# Patient Record
Sex: FEMALE | Race: WHITE | NOT HISPANIC OR LATINO | URBAN - METROPOLITAN AREA
[De-identification: names, ages, dates, MRNs, and addresses within clinical notes are randomized per-mention and may not be internally consistent; named-entity substitution may affect disease eponyms.]

---

## 2018-07-26 ENCOUNTER — OFFICE (OUTPATIENT)
Dept: URBAN - METROPOLITAN AREA CLINIC 14 | Facility: CLINIC | Age: 63
End: 2018-07-26

## 2018-07-26 VITALS
SYSTOLIC BLOOD PRESSURE: 126 MMHG | DIASTOLIC BLOOD PRESSURE: 87 MMHG | WEIGHT: 150 LBS | HEIGHT: 61 IN | HEART RATE: 62 BPM

## 2018-07-26 DIAGNOSIS — Z12.11 ENCOUNTER FOR SCREENING FOR MALIGNANT NEOPLASM OF COLON: ICD-10-CM

## 2018-07-26 DIAGNOSIS — Z79.01 LONG TERM (CURRENT) USE OF ANTICOAGULANTS: ICD-10-CM

## 2018-07-26 DIAGNOSIS — Z95.4 PRESENCE OF OTHER HEART-VALVE REPLACEMENT: ICD-10-CM

## 2018-07-26 DIAGNOSIS — Z86.718 PERSONAL HISTORY OF OTHER VENOUS THROMBOSIS AND EMBOLISM: ICD-10-CM

## 2018-07-26 DIAGNOSIS — Z01.818 ENCOUNTER FOR OTHER PREPROCEDURAL EXAMINATION: ICD-10-CM

## 2018-07-26 DIAGNOSIS — Q87.40 MARFAN SYNDROME, UNSPECIFIED: ICD-10-CM

## 2018-07-26 DIAGNOSIS — I26.99 OTHER PULMONARY EMBOLISM WITHOUT ACUTE COR PULMONALE: ICD-10-CM

## 2018-07-26 DIAGNOSIS — E66.3 OVERWEIGHT: ICD-10-CM

## 2018-07-26 PROCEDURE — 99203 OFFICE O/P NEW LOW 30 MIN: CPT | Performed by: INTERNAL MEDICINE

## 2018-07-26 RX ORDER — SODIUM PICOSULFATE, MAGNESIUM OXIDE, AND ANHYDROUS CITRIC ACID 10; 3.5; 12 MG/160ML; G/160ML; G/160ML
LIQUID ORAL
Qty: 1 | Refills: 0 | Status: ACTIVE
Start: 2018-07-26

## 2018-07-26 NOTE — SERVICEHPINOTES
Ms. Trimble is a 63-year-old female here for evaluation for colon cancer screening. Patient has never had a colonoscopy and she denies any first-degree family history of colon cancer colon polyps. She is average risk for colon cancer screening but is overdue. The patient does have a significant cardiovascular history as she has a history of Marfan syndrome which led to a valvular disorder. She underwent valve replacement with a cow valve in 2015 and was previously followed by Dr. Blevins for this but had to switch to a new cardiologist, whom she has seen next week, because of insurance issues. The she did have issues with a DVT related to a broken leg around 2012 which led to what sounds like a significant pulmonary embolus which required placement of an IVC filter as well as thrombolysis. She states that her cardiovascular surgeon, Dr. Charles try to remove the IVC filter twice but failed both times. Patient has been on Eliquis in the past but is now on Xarelto since the beginning of this year. She states that she does have a history of atrial fibrillation but underwent some sort of ablation procedure and this is now better. She denies any issues with chest pain or shortness of breath. She states that she is able to get around on her own without any symptoms. She denies any fevers, chills, nausea, vomiting, reflux, dysphagia, constipation, diarrhea, or rectal bleeding.

## 2018-07-26 NOTE — SERVICENOTES
She is average risk for colon cancer screening but is overdue.  We will schedule accordingly but will need cardiac clearance including clearance to hold her Xarelto.  I explained to the patient that we will restart the Xarelto depending on what polyps were found.  If there small polyps we can restart the Xarelto and a couple of days but if there are large polyps removed we may not be able to restart Xarelto for a week or two.  I did recommend that when she sees her cardiologist next week she also discussed whether not she would need to bridge with Lovenox.